# Patient Record
Sex: MALE | Race: WHITE | NOT HISPANIC OR LATINO | URBAN - METROPOLITAN AREA
[De-identification: names, ages, dates, MRNs, and addresses within clinical notes are randomized per-mention and may not be internally consistent; named-entity substitution may affect disease eponyms.]

---

## 2021-02-09 VITALS
DIASTOLIC BLOOD PRESSURE: 82 MMHG | OXYGEN SATURATION: 98 % | DIASTOLIC BLOOD PRESSURE: 72 MMHG | RESPIRATION RATE: 17 BRPM | SYSTOLIC BLOOD PRESSURE: 120 MMHG | SYSTOLIC BLOOD PRESSURE: 78 MMHG | SYSTOLIC BLOOD PRESSURE: 125 MMHG | DIASTOLIC BLOOD PRESSURE: 57 MMHG | TEMPERATURE: 98.3 F | OXYGEN SATURATION: 100 % | HEART RATE: 66 BPM | RESPIRATION RATE: 18 BRPM | DIASTOLIC BLOOD PRESSURE: 84 MMHG | OXYGEN SATURATION: 97 % | RESPIRATION RATE: 14 BRPM | HEART RATE: 67 BPM | SYSTOLIC BLOOD PRESSURE: 100 MMHG | RESPIRATION RATE: 16 BRPM | RESPIRATION RATE: 9 BRPM | HEART RATE: 65 BPM | SYSTOLIC BLOOD PRESSURE: 90 MMHG | DIASTOLIC BLOOD PRESSURE: 59 MMHG | DIASTOLIC BLOOD PRESSURE: 89 MMHG | TEMPERATURE: 96 F | SYSTOLIC BLOOD PRESSURE: 111 MMHG | DIASTOLIC BLOOD PRESSURE: 52 MMHG | SYSTOLIC BLOOD PRESSURE: 88 MMHG | DIASTOLIC BLOOD PRESSURE: 56 MMHG | HEART RATE: 68 BPM | HEIGHT: 69 IN | WEIGHT: 182 LBS | RESPIRATION RATE: 15 BRPM | SYSTOLIC BLOOD PRESSURE: 86 MMHG | HEART RATE: 63 BPM

## 2021-02-11 PROBLEM — Z12.11 SCREENING FOR COLONIC NEOPLASIA: Status: ACTIVE | Noted: 2021-02-12

## 2021-02-12 ENCOUNTER — AMBULATORY SURGICAL CENTER (OUTPATIENT)
Dept: URBAN - METROPOLITAN AREA SURGERY 17 | Facility: SURGERY | Age: 51
End: 2021-02-12
Payer: COMMERCIAL

## 2021-02-12 DIAGNOSIS — Z12.11 ENCOUNTER FOR SCREENING FOR MALIGNANT NEOPLASM OF COLON: ICD-10-CM

## 2021-02-12 PROCEDURE — 45378 DIAGNOSTIC COLONOSCOPY: CPT | Mod: 33 | Performed by: INTERNAL MEDICINE

## 2021-09-07 ENCOUNTER — HOSPITAL ENCOUNTER (EMERGENCY)
Facility: HOSPITAL | Age: 51
Discharge: HOME | End: 2021-09-07
Attending: EMERGENCY MEDICINE | Admitting: EMERGENCY MEDICINE

## 2021-09-07 ENCOUNTER — APPOINTMENT (OUTPATIENT)
Dept: GENERAL RADIOLOGY | Facility: HOSPITAL | Age: 51
End: 2021-09-07

## 2021-09-07 VITALS
OXYGEN SATURATION: 96 % | TEMPERATURE: 97.7 F | SYSTOLIC BLOOD PRESSURE: 147 MMHG | RESPIRATION RATE: 20 BRPM | HEART RATE: 58 BPM | DIASTOLIC BLOOD PRESSURE: 109 MMHG

## 2021-09-07 DIAGNOSIS — R07.89 ATYPICAL CHEST PAIN: Primary | ICD-10-CM

## 2021-09-07 LAB
ALBUMIN SERPL-MCNC: 4.2 G/DL (ref 3.5–5.2)
ALBUMIN/GLOB SERPL: 1.6 G/DL
ALP SERPL-CCNC: 107 U/L (ref 39–117)
ALT SERPL W P-5'-P-CCNC: 52 U/L (ref 1–41)
ANION GAP SERPL CALCULATED.3IONS-SCNC: 13.9 MMOL/L (ref 5–15)
AST SERPL-CCNC: 61 U/L (ref 1–40)
BASOPHILS # BLD AUTO: 0.06 10*3/MM3 (ref 0–0.2)
BASOPHILS NFR BLD AUTO: 0.8 % (ref 0–1.5)
BILIRUB SERPL-MCNC: 0.8 MG/DL (ref 0–1.2)
BUN SERPL-MCNC: 6 MG/DL (ref 6–20)
BUN/CREAT SERPL: 5.9 (ref 7–25)
CALCIUM SPEC-SCNC: 8.8 MG/DL (ref 8.6–10.5)
CHLORIDE SERPL-SCNC: 102 MMOL/L (ref 98–107)
CO2 SERPL-SCNC: 23.1 MMOL/L (ref 22–29)
CREAT SERPL-MCNC: 1.02 MG/DL (ref 0.76–1.27)
DEPRECATED RDW RBC AUTO: 50.1 FL (ref 37–54)
EOSINOPHIL # BLD AUTO: 0.05 10*3/MM3 (ref 0–0.4)
EOSINOPHIL NFR BLD AUTO: 0.6 % (ref 0.3–6.2)
ERYTHROCYTE [DISTWIDTH] IN BLOOD BY AUTOMATED COUNT: 14.5 % (ref 12.3–15.4)
GFR SERPL CREATININE-BSD FRML MDRD: 77 ML/MIN/1.73
GLOBULIN UR ELPH-MCNC: 2.7 GM/DL
GLUCOSE SERPL-MCNC: 84 MG/DL (ref 65–99)
HCT VFR BLD AUTO: 45.7 % (ref 37.5–51)
HGB BLD-MCNC: 15.6 G/DL (ref 13–17.7)
IMM GRANULOCYTES # BLD AUTO: 0.03 10*3/MM3 (ref 0–0.05)
IMM GRANULOCYTES NFR BLD AUTO: 0.4 % (ref 0–0.5)
LYMPHOCYTES # BLD AUTO: 0.99 10*3/MM3 (ref 0.7–3.1)
LYMPHOCYTES NFR BLD AUTO: 12.8 % (ref 19.6–45.3)
MCH RBC QN AUTO: 31.8 PG (ref 26.6–33)
MCHC RBC AUTO-ENTMCNC: 34.1 G/DL (ref 31.5–35.7)
MCV RBC AUTO: 93.1 FL (ref 79–97)
MONOCYTES # BLD AUTO: 0.8 10*3/MM3 (ref 0.1–0.9)
MONOCYTES NFR BLD AUTO: 10.3 % (ref 5–12)
NEUTROPHILS NFR BLD AUTO: 5.81 10*3/MM3 (ref 1.7–7)
NEUTROPHILS NFR BLD AUTO: 75.1 % (ref 42.7–76)
NRBC BLD AUTO-RTO: 0 /100 WBC (ref 0–0.2)
PLATELET # BLD AUTO: 196 10*3/MM3 (ref 140–450)
PMV BLD AUTO: 9.1 FL (ref 6–12)
POTASSIUM SERPL-SCNC: 3.4 MMOL/L (ref 3.5–5.2)
PROT SERPL-MCNC: 6.9 G/DL (ref 6–8.5)
QT INTERVAL: 675 MS
RBC # BLD AUTO: 4.91 10*6/MM3 (ref 4.14–5.8)
SODIUM SERPL-SCNC: 139 MMOL/L (ref 136–145)
TROPONIN T SERPL-MCNC: <0.01 NG/ML (ref 0–0.03)
WBC # BLD AUTO: 7.74 10*3/MM3 (ref 3.4–10.8)

## 2021-09-07 PROCEDURE — 99283 EMERGENCY DEPT VISIT LOW MDM: CPT

## 2021-09-07 PROCEDURE — 93010 ELECTROCARDIOGRAM REPORT: CPT | Performed by: INTERNAL MEDICINE

## 2021-09-07 PROCEDURE — 80053 COMPREHEN METABOLIC PANEL: CPT | Performed by: EMERGENCY MEDICINE

## 2021-09-07 PROCEDURE — 71045 X-RAY EXAM CHEST 1 VIEW: CPT

## 2021-09-07 PROCEDURE — 84484 ASSAY OF TROPONIN QUANT: CPT | Performed by: EMERGENCY MEDICINE

## 2021-09-07 PROCEDURE — 93005 ELECTROCARDIOGRAM TRACING: CPT

## 2021-09-07 PROCEDURE — 85025 COMPLETE CBC W/AUTO DIFF WBC: CPT | Performed by: EMERGENCY MEDICINE

## 2021-09-07 NOTE — ED PROVIDER NOTES
EMERGENCY DEPARTMENT ENCOUNTER    Room Number:  16/16  Date seen:  9/7/2021  PCP: THOMAS Duron MD  Historian: Patient      HPI:  Chief Complaint: Chest tightness  A complete HPI/ROS/PMH/PSH/SH/FH are unobtainable due to: Nothing  Context: Ha Ho is a 50 y.o. male who presents to the ED c/o episode of chest tightness that occurred while sitting at his desk at work today.  He reports that his chest felt tight and he also had associated nausea and felt short of breath.  The symptoms last about 45 minutes.  He denies any symptoms currently.  He reports a history of anxiety and he believes he may have just had a panic attack.  He is not a smoker.  He does drink alcohol socially.  He denies history of coronary disease.  He does have a history of prolonged QT interval.  He previously saw a cardiologist but reports it has been quite a while.  He also recalls having a stress test done at some point the past but cannot recall when.  He denies any history of blood clots.  No leg pain or leg swelling.  No recent long car rides or immobilization.            PAST MEDICAL HISTORY  Active Ambulatory Problems     Diagnosis Date Noted   • No Active Ambulatory Problems     Resolved Ambulatory Problems     Diagnosis Date Noted   • No Resolved Ambulatory Problems     No Additional Past Medical History         PAST SURGICAL HISTORY  No past surgical history on file.      FAMILY HISTORY  No family history on file.      SOCIAL HISTORY  Social History     Socioeconomic History   • Marital status:      Spouse name: Not on file   • Number of children: Not on file   • Years of education: Not on file   • Highest education level: Not on file         ALLERGIES  Patient has no known allergies.        REVIEW OF SYSTEMS  Review of Systems   Review of all 14 systems is negative other than stated in the HPI above.      PHYSICAL EXAM  ED Triage Vitals [09/07/21 1442]   Temp Heart Rate Resp BP SpO2   97.7 °F (36.5 °C) 55 20 134/98 98 %       Temp src Heart Rate Source Patient Position BP Location FiO2 (%)   -- -- -- -- --         GENERAL: Awake and alert, no acute distress  HENT: nares patent  EYES: no scleral icterus, EOMI  CV: regular rhythm, normal rate, no murmur   RESPIRATORY: normal effort, lungs clear to auscultation bilaterally  ABDOMEN: soft, nondistended, nontender throughout  MUSCULOSKELETAL: no deformity, no lower extremity edema, no calf tenderness bilaterally   NEURO: alert, moves all extremities, follows commands  PSYCH:  calm, cooperative  SKIN: warm, dry    Vital signs and nursing notes reviewed.          LAB RESULTS  Recent Results (from the past 24 hour(s))   Troponin    Collection Time: 09/07/21  3:32 PM    Specimen: Blood   Result Value Ref Range    Troponin T <0.010 0.000 - 0.030 ng/mL   Comprehensive Metabolic Panel    Collection Time: 09/07/21  3:32 PM    Specimen: Blood   Result Value Ref Range    Glucose 84 65 - 99 mg/dL    BUN 6 6 - 20 mg/dL    Creatinine 1.02 0.76 - 1.27 mg/dL    Sodium 139 136 - 145 mmol/L    Potassium 3.4 (L) 3.5 - 5.2 mmol/L    Chloride 102 98 - 107 mmol/L    CO2 23.1 22.0 - 29.0 mmol/L    Calcium 8.8 8.6 - 10.5 mg/dL    Total Protein 6.9 6.0 - 8.5 g/dL    Albumin 4.20 3.50 - 5.20 g/dL    ALT (SGPT) 52 (H) 1 - 41 U/L    AST (SGOT) 61 (H) 1 - 40 U/L    Alkaline Phosphatase 107 39 - 117 U/L    Total Bilirubin 0.8 0.0 - 1.2 mg/dL    eGFR Non African Amer 77 >60 mL/min/1.73    Globulin 2.7 gm/dL    A/G Ratio 1.6 g/dL    BUN/Creatinine Ratio 5.9 (L) 7.0 - 25.0    Anion Gap 13.9 5.0 - 15.0 mmol/L   CBC Auto Differential    Collection Time: 09/07/21  3:32 PM    Specimen: Blood   Result Value Ref Range    WBC 7.74 3.40 - 10.80 10*3/mm3    RBC 4.91 4.14 - 5.80 10*6/mm3    Hemoglobin 15.6 13.0 - 17.7 g/dL    Hematocrit 45.7 37.5 - 51.0 %    MCV 93.1 79.0 - 97.0 fL    MCH 31.8 26.6 - 33.0 pg    MCHC 34.1 31.5 - 35.7 g/dL    RDW 14.5 12.3 - 15.4 %    RDW-SD 50.1 37.0 - 54.0 fl    MPV 9.1 6.0 - 12.0 fL     Platelets 196 140 - 450 10*3/mm3    Neutrophil % 75.1 42.7 - 76.0 %    Lymphocyte % 12.8 (L) 19.6 - 45.3 %    Monocyte % 10.3 5.0 - 12.0 %    Eosinophil % 0.6 0.3 - 6.2 %    Basophil % 0.8 0.0 - 1.5 %    Immature Grans % 0.4 0.0 - 0.5 %    Neutrophils, Absolute 5.81 1.70 - 7.00 10*3/mm3    Lymphocytes, Absolute 0.99 0.70 - 3.10 10*3/mm3    Monocytes, Absolute 0.80 0.10 - 0.90 10*3/mm3    Eosinophils, Absolute 0.05 0.00 - 0.40 10*3/mm3    Basophils, Absolute 0.06 0.00 - 0.20 10*3/mm3    Immature Grans, Absolute 0.03 0.00 - 0.05 10*3/mm3    nRBC 0.0 0.0 - 0.2 /100 WBC   ECG 12 Lead    Collection Time: 09/07/21  4:37 PM   Result Value Ref Range    QT Interval 675 ms       Ordered the above labs and reviewed the results.        RADIOLOGY  XR Chest 1 View    Result Date: 9/7/2021  XR CHEST 1 VW-  HISTORY: Male who is 50 years-old,  chest pain  TECHNIQUE: Frontal view of the chest  COMPARISON: None available  FINDINGS: Heart size is normal. Aorta is tortuous. Pulmonary vasculature is unremarkable. No focal pulmonary consolidation, pleural effusion, or pneumothorax is seen, lateral costophrenic angles are slightly excluded. Old granulomatous disease is apparent. No acute osseous process.      No focal pulmonary consolidation. Tortuous aorta. Follow-up as clinically indicated  This report was finalized on 9/7/2021 4:01 PM by Dr. Allen Davis M.D.        Ordered the above noted radiological studies. Reviewed by me in PACS.            PROCEDURES  Procedures            MEDICATIONS GIVEN IN ER  Medications - No data to display                MEDICAL DECISION MAKING, PROGRESS, and CONSULTS    All labs have been independently reviewed by me.  All radiology studies have been reviewed by me and discussed with radiologist dictating the report.   EKG's independently viewed and interpreted by me.  Discussion below represents my analysis of pertinent findings related to patient's condition, differential diagnosis, treatment plan  and final disposition.      Differential diagnosis includes but is not limited to:  Pneumothorax  Acute coronary syndrome  Anxiety attack  Pulmonary embolus  Acute CHF  Pericarditis  Pneumonia      ED Course as of Sep 07 1855   Tue Sep 07, 2021   1606 Troponin T: <0.010 [JR]   1606 WBC: 7.74 [JR]   1606 Hemoglobin: 15.6 [JR]   1606 Potassium(!): 3.4 [JR]   1606 ALT (SGPT)(!): 52 [JR]   1606 AST (SGOT)(!): 61 [JR]   1607 SpO2: 98 % [JR]   1607 Device (Oxygen Therapy): room air [JR]   1607 Chest x-ray independently reviewed in PACS and demonstrates no acute pneumothorax, no infiltrate, no pleural effusion.    [JR]   1607 HEART score 1 for age.  EKG and Tn negative.  CXR without pneumothorax or mediastinal widening to suggest dissection.  Low risk for PE by Well's score, clinical gestault.      [JR]   1640 EKG          EKG time: 1637  Rhythm/Rate: Sinus bradycardia, 54  P waves and MI: Normal  QRS, axis: Normal axis  ST and T waves: Prolonged QT    Interpreted Contemporaneously by me, independently viewed  Similar compared to prior earlier today from EMS          [JR]      ED Course User Index  [JR] Cliff Mcnair MD              I wore a mask, face shield, and gloves during this patient encounter.  Patient also wearing a surgical mask.  Hand hygeine performed before and after seeing the patient.    DIAGNOSIS  Final diagnoses:   Atypical chest pain         DISPOSITION  DISCHARGE    Patient discharged in stable condition.    Reviewed implications of results, diagnosis, meds, responsibility to follow up, warning signs and symptoms of possible worsening, potential complications and reasons to return to ER.    Patient/Family voiced understanding of above instructions.    Discussed plan for discharge, as there is no emergent indication for admission. Patient referred to primary care provider for BP management due to today's BP. Pt/family is agreeable and understands need for follow up and repeat testing.  Pt is  aware that discharge does not mean that nothing is wrong but it indicates no emergency is present that requires admission and they must continue care with follow-up as given below or physician of their choice.     FOLLOW-UP  THOMAS Duron MD  [address] [address] [phone]    Schedule an appointment as soon as possible for a visit            Medication List      No changes were made to your prescriptions during this visit.                   Latest Documented Vital Signs:  As of 18:55 EDT  BP- (!) 147/109 HR- 58 Temp- 97.7 °F (36.5 °C) O2 sat- 96%        --    Please note that portions of this were completed with a voice recognition program.          Cliff Mcnair MD  09/07/21 0870

## 2021-09-07 NOTE — ED NOTES
Patient from work via EMS; call was for chest pain. Patient had acute onset of dizziness and feeling like he was going to pass out. Episode lasted approximately 45 minutes. Hx of anxiety and felt like he could have had anxiety attack.     Job, [place], LOR  09/07/21 5216

## 2023-11-02 ENCOUNTER — TELEPHONE (OUTPATIENT)
Dept: ORTHOPEDIC SURGERY | Facility: CLINIC | Age: 53
End: 2023-11-02

## 2023-11-02 NOTE — TELEPHONE ENCOUNTER
Caller: MARYELLEN    Relationship to patient: [facility]   Best call back number: [phone]    Chief complaint: FX LEFT FOOT     Type of visit: NEW PATIENT WORK COMP OCC     Requested date: WITHIN 24 HRS FRACTURE      If rescheduling, when is the original appointment: N/A     Additional notes:REFERRAL IN SCHEDULE REVIEW STATUS   WORK COMP OCC, MARYELLEN WILL SCAN XRAY REPORT , PN, WORK COMP AUTH INTO PATIENT'S CHART.  [employer] , ADJUSTOR GORDY RUIZ  [phone] [facility] [id number]  DOI 10/31/23

## 2023-11-14 ENCOUNTER — OFFICE VISIT (OUTPATIENT)
Dept: SPORTS MEDICINE | Facility: CLINIC | Age: 53
End: 2023-11-14
Payer: OTHER MISCELLANEOUS

## 2023-11-14 VITALS
BODY MASS INDEX: 26.66 KG/M2 | DIASTOLIC BLOOD PRESSURE: 96 MMHG | SYSTOLIC BLOOD PRESSURE: 130 MMHG | HEART RATE: 67 BPM | OXYGEN SATURATION: 97 % | WEIGHT: 180 LBS | HEIGHT: 69 IN | TEMPERATURE: 97.8 F

## 2023-11-14 DIAGNOSIS — S82.832A CLOSED FRACTURE OF DISTAL END OF LEFT FIBULA, UNSPECIFIED FRACTURE MORPHOLOGY, INITIAL ENCOUNTER: Primary | ICD-10-CM

## 2023-11-14 RX ORDER — DEXTROAMPHETAMINE SACCHARATE, AMPHETAMINE ASPARTATE MONOHYDRATE, DEXTROAMPHETAMINE SULFATE AND AMPHETAMINE SULFATE 7.5; 7.5; 7.5; 7.5 MG/1; MG/1; MG/1; MG/1
1 CAPSULE, EXTENDED RELEASE ORAL DAILY
COMMUNITY
Start: 2023-10-10

## 2023-11-14 RX ORDER — LORAZEPAM 2 MG/1
TABLET ORAL
COMMUNITY

## 2023-11-14 RX ORDER — ATORVASTATIN CALCIUM 20 MG/1
20 TABLET, FILM COATED ORAL
COMMUNITY

## 2023-11-14 RX ORDER — NADOLOL 80 MG/1
80 TABLET ORAL DAILY
COMMUNITY

## 2023-11-14 RX ORDER — VILAZODONE HYDROCHLORIDE 40 MG/1
40 TABLET ORAL DAILY
COMMUNITY

## 2023-11-14 NOTE — LETTER
November 29, 2023       No Recipients    Patient: Ha Ho   YOB: 1970   Date of Visit: 11/14/2023       Dear Kem Gar PA-C,    Thank you for referring Ha Ho to me for evaluation. Below is a copy of my consult note.    If you have questions, please do not hesitate to call me. I look forward to following Ha along with you.         Sincerely,        Sarahy Camacho DO        CC:   No Recipients    Ha is a 53 y.o. 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  Injury occurred 10/31/23 when he rolled his ankle while coming down a ladder at work. [place] a pop. Iced when he got home but pain was throbbing and keeping him up at night so he presented for evaluation the following day. X-rays were taken which showed a distal fibula fracture.   Pain is currently rated 8/10 and described as a stabbing, throbbing, and aching pain.   Pain is located on the lateral aspect.   Also has pain at his left great toe and first MTP joint.   Admits to associated swelling, bruising (including dependent bruising to toes), and stiffness.   Denies any numbness or tingling.   Does not feel like his toe has been red or hot to the touch.  Pain worsens with weight bearing, and movement of the ankle and toe, including flexion of the great toe.   Has been managing symptoms with Advil as needed, but that has been infrequent.  Denies any previous injury or occurrence.   History of gout previously on the right great toe.      The following data was reviewed by: Sarahy Camacho DO on 11/14/2023:  Data reviewed : Imaging      Left Ankle X-Ray from 11/1/23  Images personally reviewed and interpreted  Indication: Pain  Views: AP, Lateral,  "Mortise  Findings:  Nondisplaced fracture at the distal tip of the lateral malleolus  No widening of the mortise  No bony lesion  Lateral soft tissue swelling  Normal joint spaces  No prior studies available for comparison.      Review of Systems   All other systems reviewed and are negative.      /96 (BP Location: Right arm, Patient Position: Sitting, Cuff Size: Adult)   Pulse 67   Temp 97.8 °F (36.6 °C) (Infrared)   Ht 175.3 cm (69\")   Wt 81.6 kg (180 lb)   SpO2 97%   BMI 26.58 kg/m²        Physical Exam  Vital signs reviewed.   General: Well developed, well nourished; No acute distress.  Eyes: conjunctiva clear; pupils equally round and reactive  ENT: external ears and nose atraumatic; hearing normal  CV: no peripheral edema, 2+ distal pulses  Resp: normal respiratory effort, no use of accessory muscles  Skin: normal color and pigmentation; no rashes or wounds; normal turgor  Psych: alert and oriented; mood and affect appropriate; recent and remote memory intact  Neuro: sensation to light touch intact    MSK Exam:  The left foot and ankle is without obvious signs of acute bony deformity. There is swelling and ecchymosis of the ankle with dependent swelling and bruising in to the foot. There is no tenderness to the medial joint line. There is focal tenderness at the distal fibula and mild tenderness of the soft tissues of the lateral ankle. There is no bony crepitus or step-off. There is mild hallux valgus with tenderness at the first MTP joint but no erythema or increased warmth associated. No other midfoot or forefoot tenderness. Active range of motion at the ankle is limited and painful at the lateral aspect. Tibia-fibula squeeze and calcaneal squeeze tests are negative. There is pain at the first MTP with forefoot squeeze. Strength testing deferred given presence of fracture. The opposite ankle is otherwise normal and stable.     Left Ankle X-Ray  Indication: Pain  Views: AP, Lateral, " Mortise  Findings:  Nondisplaced fracture at the distal tip of the lateral malleolus again visualized with no change  No widening of the mortise  No bony lesion  Lateral soft tissue swelling  Small effusion  Compared to images from 11/1/23    Left Foot X-Ray  Indication: Pain  AP, Lateral, and Oblique views  Findings:  No fracture  No bony lesion  Mild hallux valgus with degenerative changes and surrounding soft tissue swelling  Small posterior calcaneal spur  No prior studies were available for comparison.      Assessment and Plan  Diagnoses and all orders for this visit:    1. 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 Initial images showed a nondisplaced distal fibula fracture. Repeat x-rays today with no significant change and no acute findings at the foot, though there are mild chronic changes at the first MTP.  We reviewed images and exam findings.  Fracture consistent with Dias A distal fibula fracture and should do well with conservative management.  He was placed in a tall cam boot which he should continue to wear when weightbearing.  I recommended continued supportive measures such as ice, elevation above the level of his heart, and over-the-counter anti-inflammatories and/or Tylenol as needed for pain and swelling. We discussed his activity level, especially related to work.  He is allowed to return to work light duty with restrictions as discussed.  We will follow-up in 2 weeks and obtain repeat imaging to monitor the fracture. All of his questions were answered and he is agreeable with the plan.    Dictated utilizing Dragon dictation.

## 2023-11-14 NOTE — LETTER
November 14, 2023     Patient: Ha Ho   YOB: 1970   Date of Visit: 11/14/2023       To Whom It May Concern:    It is my medical opinion that Ha Ho may return to light duty immediately with the following restrictions: No lifting, no climbing ladders, no walking or standing for extended periods. Should be limited to a seated or desk position until cleared by a physician.           Sincerely,        Sarahy Camacho DO    CC:   No Recipients

## 2023-11-14 NOTE — PROGRESS NOTES
Ha is a 53 y.o. 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  Injury occurred 10/31/23 when he rolled his ankle while coming down a ladder at work. [place] a pop. Iced when he got home but pain was throbbing and keeping him up at night so he presented for evaluation the following day. X-rays were taken which showed a distal fibula fracture.   Pain is currently rated 8/10 and described as a stabbing, throbbing, and aching pain.   Pain is located on the lateral aspect.   Also has pain at his left great toe and first MTP joint.   Admits to associated swelling, bruising (including dependent bruising to toes), and stiffness.   Denies any numbness or tingling.   Does not feel like his toe has been red or hot to the touch.  Pain worsens with weight bearing, and movement of the ankle and toe, including flexion of the great toe.   Has been managing symptoms with Advil as needed, but that has been infrequent.  Denies any previous injury or occurrence.   History of gout previously on the right great toe.      The following data was reviewed by: Sarahy Camacho DO on 11/14/2023:  Data reviewed : Imaging      Left Ankle X-Ray from 11/1/23  Images personally reviewed and interpreted  Indication: Pain  Views: AP, Lateral, Mortise  Findings:  Nondisplaced fracture at the distal tip of the lateral malleolus  No widening of the mortise  No bony lesion  Lateral soft tissue swelling  Normal joint spaces  No prior studies available for comparison.      Review of Systems   All other systems reviewed and are negative.      /96 (BP Location: Right arm, Patient Position: Sitting, Cuff Size: Adult)   Pulse 67   Temp 97.8 °F (36.6 °C) (Infrared)   Ht 175.3 cm  "(69\")   Wt 81.6 kg (180 lb)   SpO2 97%   BMI 26.58 kg/m²        Physical Exam  Vital signs reviewed.   General: Well developed, well nourished; No acute distress.  Eyes: conjunctiva clear; pupils equally round and reactive  ENT: external ears and nose atraumatic; hearing normal  CV: no peripheral edema, 2+ distal pulses  Resp: normal respiratory effort, no use of accessory muscles  Skin: normal color and pigmentation; no rashes or wounds; normal turgor  Psych: alert and oriented; mood and affect appropriate; recent and remote memory intact  Neuro: sensation to light touch intact    MSK Exam:  The left foot and ankle is without obvious signs of acute bony deformity. There is swelling and ecchymosis of the ankle with dependent swelling and bruising in to the foot. There is no tenderness to the medial joint line. There is focal tenderness at the distal fibula and mild tenderness of the soft tissues of the lateral ankle. There is no bony crepitus or step-off. There is mild hallux valgus with tenderness at the first MTP joint but no erythema or increased warmth associated. No other midfoot or forefoot tenderness. Active range of motion at the ankle is limited and painful at the lateral aspect. Tibia-fibula squeeze and calcaneal squeeze tests are negative. There is pain at the first MTP with forefoot squeeze. Strength testing deferred given presence of fracture. The opposite ankle is otherwise normal and stable.     Left Ankle X-Ray  Indication: Pain  Views: AP, Lateral, Mortise  Findings:  Nondisplaced fracture at the distal tip of the lateral malleolus again visualized with no change  No widening of the mortise  No bony lesion  Lateral soft tissue swelling  Small effusion  Compared to images from 11/1/23    Left Foot X-Ray  Indication: Pain  AP, Lateral, and Oblique views  Findings:  No fracture  No bony lesion  Mild hallux valgus with degenerative changes and surrounding soft tissue swelling  Small posterior " calcaneal spur  No prior studies were available for comparison.      Assessment and Plan  Diagnoses and all orders for this visit:    1. 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 Initial images showed a nondisplaced distal fibula fracture. Repeat x-rays today with no significant change and no acute findings at the foot, though there are mild chronic changes at the first MTP.  We reviewed images and exam findings.  Fracture consistent with Dias A distal fibula fracture and should do well with conservative management.  He was placed in a tall cam boot which he should continue to wear when weightbearing.  I recommended continued supportive measures such as ice, elevation above the level of his heart, and over-the-counter anti-inflammatories and/or Tylenol as needed for pain and swelling. We discussed his activity level, especially related to work.  He is allowed to return to work light duty with restrictions as discussed.  We will follow-up in 2 weeks and obtain repeat imaging to monitor the fracture. All of his questions were answered and he is agreeable with the plan.    Dictated utilizing Dragon dictation.

## 2023-11-20 ENCOUNTER — TELEPHONE (OUTPATIENT)
Dept: SPORTS MEDICINE | Facility: CLINIC | Age: 53
End: 2023-11-20

## 2023-11-20 NOTE — TELEPHONE ENCOUNTER
Caller: GORDY CAMPOS/ [employer]    Relationship: W/C     Best call back number: [phone]    What form or medical record are you requesting: OFFICE NOTE AND WORK STATUS 11/14/23    Who is requesting this form or medical record from you: W/C    How would you like to receive the form or medical records (pick-up, mail, fax): FAX  If fax, what is the fax number: [phone]    Timeframe paperwork needed: ASAP

## 2023-11-21 ENCOUNTER — PATIENT ROUNDING (BHMG ONLY) (OUTPATIENT)
Dept: SPORTS MEDICINE | Facility: CLINIC | Age: 53
End: 2023-11-21
Payer: COMMERCIAL

## 2023-11-21 NOTE — PROGRESS NOTES
November 21, 2023      A Welcome Card has been sent to the patient for [place] [facility]

## 2023-11-29 ENCOUNTER — OFFICE VISIT (OUTPATIENT)
Dept: SPORTS MEDICINE | Facility: CLINIC | Age: 53
End: 2023-11-29
Payer: OTHER MISCELLANEOUS

## 2023-11-29 VITALS — WEIGHT: 180 LBS | TEMPERATURE: 98.1 F | BODY MASS INDEX: 26.66 KG/M2 | HEIGHT: 69 IN

## 2023-11-29 DIAGNOSIS — M10.9 ACUTE GOUT INVOLVING TOE OF LEFT FOOT, UNSPECIFIED CAUSE: ICD-10-CM

## 2023-11-29 DIAGNOSIS — S82.832D CLOSED FRACTURE OF DISTAL END OF LEFT FIBULA WITH ROUTINE HEALING, UNSPECIFIED FRACTURE MORPHOLOGY, SUBSEQUENT ENCOUNTER: Primary | ICD-10-CM

## 2023-11-29 RX ORDER — PREDNISONE 10 MG/1
TABLET ORAL
Qty: 35 TABLET | Refills: 0 | Status: SHIPPED | OUTPATIENT
Start: 2023-11-29 | End: 2023-12-14

## 2023-11-29 NOTE — LETTER
November 29, 2023     Patient: Ha Ho   YOB: 1970   Date of Visit: 11/29/2023       To Whom It May Concern:    It is my medical opinion that Ha Ho may return to light duty immediately with the following restrictions: No lifting, no climbing ladders, no walking or standing for extended periods. Should remain in boot and be limited to a seated or desk position until cleared by a physician. We will follow-up in roughly 2 weeks and updated recommendations will be made at that time.           Sincerely,        Sarahy Camacho DO    CC:   No Recipients

## 2023-11-29 NOTE — LETTER
"November 29, 2023     THOMAS Duron MD  [address] [id number]    Patient: Ha Ho   YOB: 1970   Date of Visit: 11/29/2023       Dear THOMAS Duron MD    Ha Ho was in my office today. Below is a copy of my note.    I am currently managing his acute left distal fibula fracture. However, he presented today with complaints of worsening symptoms at the first MTP with findings consistent with an acute gout flare. He admits to a history of gout with more frequent flares recently. I prescribed a course of prednisone for the acute flare but recommended that he follow-up with you for further evaluation and to discuss possible chronic management.     If you have questions, please do not hesitate to call me. I look forward to following Ha along with you.         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 Initial images showed a nondisplaced distal fibula fracture. Fracture consistent with Dias A distal fibula fracture and conservative management was recommended. Please see previous notes for complete history and treatment course. He returns today reporting improvement in his ankle pain.  He has remained in the boot but was able to discontinue the crutches last week.  Though he does admit to walking a little more on Thanksgiving day and could \"feel it\" by the end of the day.  He does admit to worsening pain at the first MTP joint that is now worse than his ankle pain.  He states that it has also been red and hot to the touch, which is new since his initial visit.  States that it is even been sensitive to light touch.  " "He has not been taking any over-the-counter medications for management of his pain.  He has been continuing to work light duty, mainly doing computer work.  He denies any new injuries.    Of note, he has a history of gout, but states it is typically at the right great toe. States that his gout flares seems to be occurring more frequently, usually every few months.      Temp 98.1 °F (36.7 °C) (Infrared)   Ht 175.3 cm (69\")   Wt 81.6 kg (180 lb)   BMI 26.58 kg/m²        Physical Exam  MSK Exam:  The left foot and ankle is without obvious signs of acute bony deformity. There is persistent but improved swelling at the lateral ankle, but bruising has resolved. There is focal tenderness at the distal fibula but this has also improved. There is no bony crepitus or step-off. There is mild hallux valgus. There is increased tenderness at the first MTP, with sensitivity even to light touch and with associated erythema or increased warmth. No other midfoot or forefoot tenderness. Active range of motion at the ankle is full and with only mild pain at the lateral ankle. Tibia-fibula squeeze and calcaneal squeeze tests are negative. The opposite ankle is otherwise normal and stable.     Left Ankle X-Ray  Indication: Pain  Views: AP, Lateral, Mortise  Findings:  Nondisplaced fracture at the distal tip of the lateral malleolus again visualized with mild callus formation best seen on lateral view  No widening of the mortise  No bony lesion  Mild lateral soft tissue swelling improved  Compared to images from 11/1/23 and 11/14/23      Assessment and Plan  Diagnoses and all orders for this visit:    1. Closed fracture of distal end of left fibula with routine healing, unspecified fracture morphology, subsequent encounter (Primary)    2. Acute gout involving toe of left foot, unspecified cause  -     predniSONE (DELTASONE) 10 MG tablet; Take 4 tablets by mouth Daily for 5 days, THEN 2 tablets Daily for 5 days, THEN 1 tablet Daily for " 5 days.  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 Initial images showed a nondisplaced distal fibula fracture. Fracture consistent with Dias A distal fibula fracture and conservative management was recommended.  He returns today reporting improvement of his lateral ankle pain, but with worsening symptoms at the first MTP. Repeat x-rays today with signs of early healing at the distal fibula and improved exam findings. However, he has worsening tenderness at the first MTP with associated erythema and warmth that is consistent with an acute gout flare. A prescription was provided for a prednisone taper to be taken as directed. May take Tylenol in addition as needed for pain. I recommended continued supportive measures for the ankle: he should remain in the cam boot with WBAT and may continue with ice and elevation above the level of his heart as needed for pain and swelling. He should continue to work light duty with restrictions as discussed.  I recommended he follow-up with his PCP to discuss additional evaluation and potential long-term management of his gout, especially as he has been having more frequent flares.  We will follow-up in 2 weeks or sooner as needed. All of his questions were answered and he is agreeable with the plan.    Dictated utilizing Dragon dictation.

## 2023-11-29 NOTE — PROGRESS NOTES
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 Initial images showed a nondisplaced distal fibula fracture. Fracture consistent with Dias A distal fibula fracture and conservative management was recommended. Please see previous notes for complete history and treatment course. He returns today reporting improvement in his ankle pain.  He has remained in the boot but was able to discontinue the crutches last week.  Though he does admit to walking a little more on Thanksgiving day and could \"feel it\" by the end of the day.  He does admit to worsening pain at the first MTP joint that is now worse than his ankle pain.  He states that it has also been red and hot to the touch, which is new since his initial visit.  States that it is even been sensitive to light touch.  He has not been taking any over-the-counter medications for management of his pain.  He has been continuing to work light duty, mainly doing computer work.  He denies any new injuries.    Of note, he has a history of gout, but states it is typically at the right great toe. States that his gout flares seems to be occurring more frequently, usually every few months.      Temp 98.1 °F (36.7 °C) (Infrared)   Ht 175.3 cm (69\")   Wt 81.6 kg (180 lb)   BMI 26.58 kg/m²        Physical Exam  MSK Exam:  The left foot and ankle is without obvious signs of acute bony deformity. There is persistent but improved swelling at the lateral ankle, but bruising has resolved. There is focal tenderness at the distal fibula but this has also improved. There is no bony crepitus or step-off. There is mild hallux valgus. There is increased tenderness at the first MTP, with sensitivity even to light " touch and with associated erythema or increased warmth. No other midfoot or forefoot tenderness. Active range of motion at the ankle is full and with only mild pain at the lateral ankle. Tibia-fibula squeeze and calcaneal squeeze tests are negative. The opposite ankle is otherwise normal and stable.     Left Ankle X-Ray  Indication: Pain  Views: AP, Lateral, Mortise  Findings:  Nondisplaced fracture at the distal tip of the lateral malleolus again visualized with mild callus formation best seen on lateral view  No widening of the mortise  No bony lesion  Mild lateral soft tissue swelling improved  Compared to images from 11/1/23 and 11/14/23      Assessment and Plan  Diagnoses and all orders for this visit:    1. Closed fracture of distal end of left fibula with routine healing, unspecified fracture morphology, subsequent encounter (Primary)    2. Acute gout involving toe of left foot, unspecified cause  -     predniSONE (DELTASONE) 10 MG tablet; Take 4 tablets by mouth Daily for 5 days, THEN 2 tablets Daily for 5 days, THEN 1 tablet Daily for 5 days.  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 Initial images showed a nondisplaced distal fibula fracture. Fracture consistent with Dias A distal fibula fracture and conservative management was recommended.  He returns today reporting improvement of his lateral ankle pain, but with worsening symptoms at the first MTP. Repeat x-rays today with signs of early healing at the distal fibula and improved exam findings. However, he has worsening tenderness at the first MTP with associated erythema and warmth that is consistent with an acute gout flare. A prescription was provided for a prednisone taper to be taken as directed. May take Tylenol in addition as needed for pain. I  recommended continued supportive measures for the ankle: he should remain in the cam boot with WBAT and may continue with ice and elevation above the level of his heart as needed for pain and swelling. He should continue to work light duty with restrictions as discussed.  I recommended he follow-up with his PCP to discuss additional evaluation and potential long-term management of his gout, especially as he has been having more frequent flares.  We will follow-up in 2 weeks or sooner as needed. All of his questions were answered and he is agreeable with the plan.    Dictated utilizing Dragon dictation.

## 2023-12-01 ENCOUNTER — DOCUMENTATION (OUTPATIENT)
Dept: SPORTS MEDICINE | Facility: CLINIC | Age: 53
End: 2023-12-01
Payer: COMMERCIAL

## 2023-12-01 NOTE — PROGRESS NOTES
Printed out office notes to fax to [facility] but before I could fax them I was informed that his notes from Dr Camacho had already been faxed.  Put office notes in vincent [facility]ana.

## 2023-12-13 ENCOUNTER — OFFICE VISIT (OUTPATIENT)
Dept: SPORTS MEDICINE | Facility: CLINIC | Age: 53
End: 2023-12-13
Payer: OTHER MISCELLANEOUS

## 2023-12-13 VITALS — WEIGHT: 180 LBS | BODY MASS INDEX: 26.66 KG/M2 | HEIGHT: 69 IN

## 2023-12-13 DIAGNOSIS — S82.832D CLOSED FRACTURE OF DISTAL END OF LEFT FIBULA WITH ROUTINE HEALING, UNSPECIFIED FRACTURE MORPHOLOGY, SUBSEQUENT ENCOUNTER: Primary | ICD-10-CM

## 2023-12-13 PROCEDURE — 99213 OFFICE O/P EST LOW 20 MIN: CPT | Performed by: STUDENT IN AN ORGANIZED HEALTH CARE EDUCATION/TRAINING PROGRAM

## 2023-12-13 NOTE — LETTER
December 15, 2023     Patient: Ha Ho   YOB: 1970   Date of Visit: 12/13/2023       To Whom It May Concern:    Ha Ho is currently under my care for a significant injury that limits his physical activity level. Due to unforseen circumstances as a result of this injury, he is unable to attend his previously planned trip. Please allow him to make changes and/or cancellations to his trip as needed.            Sincerely,        Sarahy Camacho,     CC:   No Recipients

## 2023-12-13 NOTE — PROGRESS NOTES
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 Initial images showed a nondisplaced distal fibula fracture. Fracture consistent with Dias A distal fibula fracture and conservative management was recommended.  At his last visit, he returned reporting improvement of his lateral ankle pain, but with worsening symptoms at the first MTP with exam findings consistent with an acute gout flare. Ankle with improved exam findings and repeat x-rays with signs of early healing. A prescription was provided for a prednisone taper for the gout flare and I recommended continued supportive measures for the ankle. Please see previous notes for complete history and treatment course. He returns today reporting continued improvement in both his ankle pain and toe pain.  Prednisone has helped with his toe pain. Swelling and redness have also improved. He still has a couple days left of the steroid. He is still having a little pain of the toe and ankle, but he reports it is \"much better\". He has remained in the boot without significant issues. He has not needed any additional medications for management of his symptoms. He has been able to return to work while in the boot with light duty restrictions but states that they have allowed him to return to the floor for light duty tasks.  He denies any new injuries. He is scheduled to go on vacation in February and is concerned about his recovery by that time.    Of note, he has a history of gout, but states it is typically at the right great toe. States that his gout flares seems to be occurring more frequently, usually every few months.      Ht 175.3 cm (69.02\")   Wt 81.6 kg (180 lb)   BMI 26.57 kg/m²        Physical Exam  MSK Exam:  The " left foot and ankle is without obvious signs of acute bony deformity. Trace swelling at the lateral ankle. There is mild persistent focal tenderness at the distal fibula but this is again improved compared to previous. There is no bony crepitus or step-off. There is mild hallux valgus. Tenderness at the first MTP has significantly improved and nearly resolved with only trace tenderness. Associated erythema and increased warmth has resolved. No other midfoot or forefoot tenderness. Active range of motion at the ankle is full and pain-free. Strength is well 4/5 and without significant pain. Tibia-fibula squeeze and calcaneal squeeze tests are negative. The opposite ankle is otherwise normal and stable.     Left Ankle X-Ray  Indication: Pain  Views: AP, Lateral, Mortise  Findings:  Nondisplaced fracture at the distal tip of the lateral malleolus again visualized with no significant change compared to previous images  No widening of the mortise  No bony lesion  Mild lateral soft tissue swelling improved  Compared to images from 11/1/23, 11/14/23, 11/29/23      Assessment and Plan  Diagnoses and all orders for this visit:    1. 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 Initial images showed a nondisplaced distal fibula fracture. Fracture consistent with Dias A distal fibula fracture and conservative management was recommended.  At his last visit, he returned reporting improvement of his lateral ankle pain, but with worsening symptoms at the first MTP with exam findings consistent with an acute gout flare. Ankle with improved exam findings and repeat x-rays with signs of early healing. A prescription was provided for a prednisone  taper for the gout flare and I recommended continued supportive measures for the ankle. He returns today with improved symptoms at the toe and ankle. There is mild persistent tenderness at the lateral malleolus and no significant change on x-ray with fracture lucency still visualized with very limited remodeling. He is roughly 6 weeks from he date of his injury and while clinically has done well, he does have significant signs of healing on x-ray. I recommended continued conservative and supportive management as previously discussed. He should remain in the boot with any activity. He may remove the boot and work on gentle ROM exercises. He should continue to work light duty with restrictions as previously recommended and an updated note was provided.  I again stressed the importance of following-up with his PCP to discuss additional evaluation and potential long-term management of his gout, especially as he has been having more frequent flares.  We will follow-up in 3 weeks or sooner as needed. All of his questions were answered and he is agreeable with the plan.    Dictated utilizing Dragon dictation.

## 2023-12-13 NOTE — LETTER
December 13, 2023     Patient: Ha Ho   YOB: 1970   Date of Visit: 12/13/2023       To Whom It May Concern:    It is my medical opinion that Ha Ho may continue with light duty immediately with the following restrictions: No lifting, no climbing ladders or stairs, no walking or standing for extended periods. Should remain in boot and be limited to a seated or desk position until cleared by a physician. We will follow-up in roughly 2-4 weeks and updated recommendations will be made at that time.            Sincerely,        Sarahy Camacho DO    CC:   No Recipients

## 2024-01-08 ENCOUNTER — OFFICE VISIT (OUTPATIENT)
Dept: SPORTS MEDICINE | Facility: CLINIC | Age: 54
End: 2024-01-08
Payer: OTHER MISCELLANEOUS

## 2024-01-08 VITALS — BODY MASS INDEX: 26.66 KG/M2 | WEIGHT: 180 LBS | HEIGHT: 69 IN | TEMPERATURE: 97.5 F

## 2024-01-08 DIAGNOSIS — S82.832D CLOSED FRACTURE OF DISTAL END OF LEFT FIBULA WITH ROUTINE HEALING, UNSPECIFIED FRACTURE MORPHOLOGY, SUBSEQUENT ENCOUNTER: Primary | ICD-10-CM

## 2024-01-08 DIAGNOSIS — R52 PAIN: ICD-10-CM

## 2024-01-08 PROCEDURE — 99213 OFFICE O/P EST LOW 20 MIN: CPT | Performed by: STUDENT IN AN ORGANIZED HEALTH CARE EDUCATION/TRAINING PROGRAM

## 2024-01-08 RX ORDER — ALLOPURINOL 100 MG/1
TABLET ORAL
COMMUNITY
Start: 2024-01-02

## 2024-01-08 RX ORDER — PREDNISONE 10 MG/1
TABLET ORAL
COMMUNITY
Start: 2024-01-02

## 2024-01-08 NOTE — LETTER
January 8, 2024     Patient: Ha Ho   YOB: 1970   Date of Visit: 1/8/2024       To Whom It May Concern:    It is my medical opinion that Ha Ho may continue with light duty immediately with the following restrictions: No lifting greater than 25 pounds, no climbing ladders or stairs, no walking or standing for extended periods. Please allow breaks as needed. We will follow-up in roughly 4 weeks and updated recommendations will be made at that time.            Sincerely,        Sarahy Camacho DO    CC:   No Recipients

## 2024-01-08 NOTE — PROGRESS NOTES
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 Initial images showed a nondisplaced distal fibula fracture. Fracture consistent with Dias A distal fibula fracture and conservative management was recommended.  At his visit on 11/29/23, he returned with improvement in ankle symptoms and exam findings, but with signs and symptoms consistent with an acute gout flare at the first MTP. Repeat x-rays on with signs of early healing. Gout flare resolved with prednisone taper and ankle has done well with conservative management. Please see previous notes for complete history and treatment course. He returns today reporting continued improvement. He currently denies any pain. He did a lot of traveling over the holidays and had no issues. Will still feel a little \"burning\" when he walks on it a lot. Also admits that it feels very weak now. He has still been in the boot a lot because he is worried about losing balance. Still feels \"stiff\" when walking out of the boot and feels weak and unbalanced. He also thinks he had another flare of gout at the toe. He has not needed any medications for management of his symptoms. He has continued to work while in the boot with light duty restrictions.  He denies any new injuries.    Of note, he has a history of gout, but states it is typically at the right great toe. States that his gout flares seems to be occurring more frequently, usually every few months.      Temp 97.5 °F (36.4 °C) (Infrared)   Ht 175.3 cm (69.02\")   Wt 81.6 kg (180 lb)   BMI 26.57 kg/m²        Physical Exam  MSK Exam:  The left foot and ankle is without obvious signs of acute bony deformity. Swelling at the lateral ankle has resolved. There is trace " focal tenderness at the distal fibula but this is again improved compared to previous. There is no bony crepitus or step-off. There is mild hallux valgus. No other midfoot or forefoot tenderness. Active range of motion at the ankle is relatively well-preserved and appears symmetrical to right, however he does have pain with eversion. Strength at the ankle with 4/5 weakness, though no significant pain. Tibia-fibula squeeze and calcaneal squeeze tests are negative. He denies pain with WB and ambulation in the office. The opposite ankle is otherwise normal and stable.     Left Foot and Ankle X-Ray  Indication: Pain, Fracture monitoring  Views: AP, Lateral, Mortise of ankle; AP, lateral, oblique of foot  Findings:  Nondisplaced fracture at the distal tip of the lateral malleolus again visualized with signs of healing with some callus formation and bridging, though fracture lucency still visualized. No significant change compared to previous images.  No widening of the mortise  No bony lesion  So soft tissue swelling  Compared to images from 11/1/23, 11/29/23, and 12/13/23      Assessment and Plan  Diagnoses and all orders for this visit:    1. Closed fracture of distal end of left fibula with routine healing, unspecified fracture morphology, subsequent encounter (Primary)  -     XR Ankle 3+ View Left  -     Ambulatory Referral to Physical Therapy Evaluate and treat; Stretching, ROM, Strengthening    2. 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 Initial images showed a nondisplaced distal fibula fracture. Fracture consistent with Dias A distal fibula fracture and conservative management was recommended.  At his visit on 11/29/23,  he returned with improvement in ankle symptoms and exam findings, but with signs and symptoms consistent with an acute gout flare at the first MTP. Repeat x-rays on with signs of early healing. Gout flare resolved with prednisone taper and ankle has done well with conservative management. He returns today with continued improvement. Repeat x-rays today show that fracture lucency is still visualized but there are signs of remodeling with small about of callus formation and bridging. I recommended continued conservative and supportive management. He is now 10 weeks out from initial injury and prolonged immobilization is likely going to worsen his weakness and mobility. I recommended that he begin to wean out of the boot as tolerated. He was provided an ankle brace to provide with him additional support. An order was provided for physical therapy to work on range of motion and strengthening. He should continue to work light duty with restrictions as previously recommended and an updated note was provided.  He should continue to follow with his PCP for management of his gout. We will follow-up in 4 weeks or sooner as needed. All of his questions were answered and he is agreeable with the plan.    Dictated utilizing Dragon dictation.

## 2024-01-09 ENCOUNTER — TREATMENT (OUTPATIENT)
Dept: PHYSICAL THERAPY | Facility: CLINIC | Age: 54
End: 2024-01-09
Payer: OTHER MISCELLANEOUS

## 2024-01-09 DIAGNOSIS — S82.832D CLOSED FRACTURE OF DISTAL END OF LEFT FIBULA WITH ROUTINE HEALING, UNSPECIFIED FRACTURE MORPHOLOGY, SUBSEQUENT ENCOUNTER: Primary | ICD-10-CM

## 2024-01-09 PROCEDURE — 97161 PT EVAL LOW COMPLEX 20 MIN: CPT | Performed by: PHYSICAL THERAPIST

## 2024-01-09 PROCEDURE — 97530 THERAPEUTIC ACTIVITIES: CPT | Performed by: PHYSICAL THERAPIST

## 2024-01-09 PROCEDURE — 97110 THERAPEUTIC EXERCISES: CPT | Performed by: PHYSICAL THERAPIST

## 2024-01-09 NOTE — PROGRESS NOTES
Physical Therapy Initial Evaluation and Plan of Care  [address] [facility], [address]  [place] [address]    Patient: Ha Ho   : 1970  Diagnosis/ICD-10 Code:  Closed fracture of distal end of left fibula with routine healing, unspecified fracture morphology, subsequent encounter [S82.832D]  Referring practitioner: Sarahy Camacho DO  Date of Initial Visit: 2024  Today's Date: 2024  Patient seen for 1 session         Visit Diagnoses:    ICD-10-CM ICD-9-CM   1. Closed fracture of distal end of left fibula with routine healing, unspecified fracture morphology, subsequent encounter  S82.832D V54.16         Subjective Questionnaire: LEFS: 49      Subjective Evaluation    History of Present Illness  Date of onset: 10/31/2023  Mechanism of injury: Patient was getting off stairs and as he stepped down the rolled the ankle inward.  Patient was seen in Urgent Care where x-rays were taken.  X-rays showed a fracture.  Patient was seen by Dr Camacho and was put in a Cam boot.  Patient was taken out of the boot yesterday.  As of yesterday, the fracture was not healed, but was healing.    Patient denies any previous injuries to the ankle.      Patient Occupation: [employer]   Precautions and Work Restrictions: light dutyPain  Current pain ratin  At worst pain ratin  Location: left lateral ankle  Quality: burning  Relieving factors: rest  Exacerbated by: using the ankle.  Progression: improved    Diagnostic Tests  Abnormal x-ray: fracture is helaing.             Objective          Observations     Additional Ankle/Foot Observation Details  Patient ambulates with an antalgic gait pattern with the use of a lace up ankle brace.    Palpation     Additional Palpation Details  TTP to the left lateral ankle.    Active Range of Motion     Additional Active Range of Motion Details  Left Ankle AROM: DF -4, Inv WNL, Eversion WNL with pain, PF 62    Strength/Myotome Testing     Left Ankle/Foot    Dorsiflexion: 4+  Plantar flexion: 5  Inversion: 4  Eversion: 4+    Tests     Additional Tests Details  N/T at this time.          Assessment & Plan       Assessment  Impairments: abnormal gait, abnormal or restricted ROM, activity intolerance, impaired balance, impaired physical strength, lacks appropriate home exercise program and pain with function   Assessment details: Patient presents with c/o pain, TTP, limited AROM, decreased strength and an antalgic gait pattern which is limiting his ability to perform full job duties and ADL'S.  Barriers to therapy: none  Prognosis: good  Prognosis details: STG's to be met by 2 weeks  1)  Independent with HEP  2)  Decrease pain by 50% or more  3)  AROM WNL for the left ankle without pain to allow for return to activities  4)  Min to no TTP present    LTG's to be met by 4 weeks  1)  Independent with HEP progression  2)  Decrease pain by 75% or more  3)  Increase strength for the left ankle to 4+/5  4)  No TTP present to indicate improved healing  5)  Patient to ambulate with a normal gait pattern  6)  Patient to return to ADL'S without limitations       Plan  Therapy options: will be seen for skilled therapy services  Planned therapy interventions: strengthening, stretching, therapeutic activities, home exercise program, gait training and neuromuscular re-education  Frequency: 2x week  Duration in weeks: 4  Treatment plan discussed with: patient            Timed:         Manual Therapy:    0     mins  96236;     Therapeutic Exercise:    14     mins  45821;     Neuromuscular Colby:    0    mins  88097;    Therapeutic Activity:     8     mins  02815;     Gait Trainin     mins  65842;     Ultrasound:     0     mins  44338;          Un-Timed:  Electrical Stimulation:    0     mins  15452 ( );    Low Eval     12     Mins  54272  Mod Eval     0     Mins  47203  High Eval                       0     Mins  87938        Timed Treatment:   22   mins   Total Treatment:      34   mins          PT: Johnny Candelaria, PT     [place] License [id number]  Electronically signed by Johnny Candelaria, PT, 01/09/24, 9:37 AM EST    Certification Period: 1/9/2024 thru 4/7/2024  I certify that the therapy services are furnished while this patient is under my care.  The services outlined above are required by this patient, and will be reviewed every 90 days.    Sarahy Camacho Do  [address] [place] [address]   NPI: [id number]      Johnny Candelaria, PT   License number: [id number]        Physician Signature:__________________________________________________    PHYSICIAN: Sarahy Camacho DO      DATE:     Please sign and return via fax to [email or web address] . Thank you, [facility].

## 2024-01-11 ENCOUNTER — TREATMENT (OUTPATIENT)
Dept: PHYSICAL THERAPY | Facility: CLINIC | Age: 54
End: 2024-01-11
Payer: OTHER MISCELLANEOUS

## 2024-01-11 DIAGNOSIS — S82.832D CLOSED FRACTURE OF DISTAL END OF LEFT FIBULA WITH ROUTINE HEALING, UNSPECIFIED FRACTURE MORPHOLOGY, SUBSEQUENT ENCOUNTER: Primary | ICD-10-CM

## 2024-01-11 PROCEDURE — 97530 THERAPEUTIC ACTIVITIES: CPT | Performed by: PHYSICAL THERAPIST

## 2024-01-11 PROCEDURE — 97110 THERAPEUTIC EXERCISES: CPT | Performed by: PHYSICAL THERAPIST

## 2024-01-11 NOTE — PROGRESS NOTES
Physical Therapy Daily Treatment Note               [address] [facility] [address]                                                                                                                                             [place] [address]    Patient: Ha Ho   : 1970  Referring practitioner: Sarahy Camacho DO  Date of Initial Visit: Type: THERAPY  Noted: 2024  Today's Date: 2024  Patient seen for 2 sessions       Visit Diagnoses:    ICD-10-CM ICD-9-CM   1. Closed fracture of distal end of left fibula with routine healing, unspecified fracture morphology, subsequent encounter  S82.832D V54.16       Subjective Evaluation    History of Present Illness    Subjective comment: Pt denies any current pain in (L) ankle.       Objective   See Exercise, Manual, and Modality Logs for complete treatment.   Added Standing gastroc stretch, staggered weight shifts    Assessment & Plan       Assessment  Assessment details: Pt completed treatment with no c/o pain in (L) ankle.  Pt was able to progress prior exercises.  He tolerated addition of closed chain weight bearing exercises for improved functional strength with no issue. Continue to progress per POC.          Timed:         Manual Therapy:    0     mins  54909;     Therapeutic Exercise:    23     mins  70851;     Neuromuscular Colby:    0   mins  16443;    Therapeutic Activity:     8     mins  77159;     Gait Trainin     mins  61512;     Ultrasound:     0     mins  93915;    E Stim                            0    mins   84582( g0283)  Work Mckeon/Cond      0    mins   98414        Timed Treatment:   31   mins   Total Treatment:     31   mins    Paul Edmondson PTA  [place] License: [id number]

## 2024-01-16 ENCOUNTER — TELEPHONE (OUTPATIENT)
Dept: ORTHOPEDICS | Facility: OTHER | Age: 54
End: 2024-01-16
Payer: OTHER MISCELLANEOUS

## 2024-01-18 ENCOUNTER — TELEPHONE (OUTPATIENT)
Dept: PHYSICAL THERAPY | Facility: CLINIC | Age: 54
End: 2024-01-18

## 2024-01-23 ENCOUNTER — TELEPHONE (OUTPATIENT)
Dept: PHYSICAL THERAPY | Facility: CLINIC | Age: 54
End: 2024-01-23

## 2024-01-25 ENCOUNTER — TREATMENT (OUTPATIENT)
Dept: PHYSICAL THERAPY | Facility: CLINIC | Age: 54
End: 2024-01-25
Payer: OTHER MISCELLANEOUS

## 2024-01-25 ENCOUNTER — TELEPHONE (OUTPATIENT)
Dept: PHYSICAL THERAPY | Facility: CLINIC | Age: 54
End: 2024-01-25

## 2024-01-25 DIAGNOSIS — S82.832D CLOSED FRACTURE OF DISTAL END OF LEFT FIBULA WITH ROUTINE HEALING, UNSPECIFIED FRACTURE MORPHOLOGY, SUBSEQUENT ENCOUNTER: Primary | ICD-10-CM

## 2024-01-25 PROCEDURE — 97110 THERAPEUTIC EXERCISES: CPT | Performed by: PHYSICAL THERAPIST

## 2024-01-25 PROCEDURE — 97530 THERAPEUTIC ACTIVITIES: CPT | Performed by: PHYSICAL THERAPIST

## 2024-01-25 NOTE — TELEPHONE ENCOUNTER
PATIENT LEFT HIS RED CAMMIE JACKET IN THE OFFICE THIS MORNING - PLEASE SAVE IT FOR HIM - HE WILL PICK IT UP NEXT TUESDAY

## 2024-01-25 NOTE — PROGRESS NOTES
Physical Therapy Daily Treatment Note  [address] [facility], [address]  [place] [address]      Patient: Ha Ho   : 1970  Referring practitioner: Sarahy Camacho DO  Date of Initial Visit: Type: THERAPY  Noted: 2024  Today's Date: 2024  Patient seen for 3 sessions       Visit Diagnoses:    ICD-10-CM ICD-9-CM   1. Closed fracture of distal end of left fibula with routine healing, unspecified fracture morphology, subsequent encounter  S82.832D V54.16        Subjective   Patient reports that the anlk ehas been pretty good, been doing a lot of walking.  Feels weak.  Currently denies pain.    Objective   See Exercise, Manual, and Modality Logs for complete treatment.       Assessment/Plan  Subjective reports are much improved.  Modified the exercise routine to include all weight bearing activity, including stretches, strengthening and proprioception.  Patient tolerated the progression of exercises very well, no visual or verbal signs of pain in the ankle.      Timed:         Manual Therapy:    0     mins  17338;     Therapeutic Exercise:    12     mins  77109;     Neuromuscular Colby:    0    mins  10524;    Therapeutic Activity:     8     mins  81645;     Gait Training      0    mins  03601;  Work Conditioning     0   mins  24959       Untimed:  Electrical Stimulation:    0     mins  38437 ( );      Timed Treatment:   20   mins   Total Treatment:     20   mins    Johnny Candelaria, PT  [place] License: [id number]

## 2024-01-30 ENCOUNTER — TELEPHONE (OUTPATIENT)
Dept: SPORTS MEDICINE | Facility: CLINIC | Age: 54
End: 2024-01-30
Payer: OTHER MISCELLANEOUS

## 2024-01-30 ENCOUNTER — TREATMENT (OUTPATIENT)
Dept: PHYSICAL THERAPY | Facility: CLINIC | Age: 54
End: 2024-01-30
Payer: OTHER MISCELLANEOUS

## 2024-01-30 DIAGNOSIS — S82.832D CLOSED FRACTURE OF DISTAL END OF LEFT FIBULA WITH ROUTINE HEALING, UNSPECIFIED FRACTURE MORPHOLOGY, SUBSEQUENT ENCOUNTER: Primary | ICD-10-CM

## 2024-01-30 PROCEDURE — 97110 THERAPEUTIC EXERCISES: CPT | Performed by: PHYSICAL THERAPIST

## 2024-01-30 PROCEDURE — 97530 THERAPEUTIC ACTIVITIES: CPT | Performed by: PHYSICAL THERAPIST

## 2024-01-30 NOTE — PROGRESS NOTES
Physical Therapy Daily Treatment Note               [address] [facility] [address]                                                                                                                                             [place] [address]    Patient: Ha Ho   : 1970  Referring practitioner: Sarahy Camacho DO  Date of Initial Visit: Type: THERAPY  Noted: 2024  Today's Date: 2024  Patient seen for 4 sessions       Visit Diagnoses:    ICD-10-CM ICD-9-CM   1. Closed fracture of distal end of left fibula with routine healing, unspecified fracture morphology, subsequent encounter  S82.832D V54.16       Subjective Evaluation    History of Present Illness    Subjective comment: Pt reports that his (L) ankle was very fatigued after his last treatment.  Only was able to do HEP twice since.       Objective   See Exercise, Manual, and Modality Logs for complete treatment.   Added squats    Assessment & Plan       Assessment  Assessment details: Pt completed treatment with no c/o pain in (L) ankle.  Pt did c/o fatigue after finishing heel raises.  When performing squats for the first time pt had no eccentric control and dropped into a very deep squat.  Pt was advised to control his descent and stop at parallel.  He finished with no further issues.  Continue to progress per POC.          Timed:         Manual Therapy:    0     mins  83535;     Therapeutic Exercise:    24     mins  14847;     Neuromuscular Colby:    0    mins  40263;    Therapeutic Activity:     8     mins  54352;     Gait Trainin     mins  48409;     Ultrasound:     0     mins  27199;    E Stim                            0    mins   94099( g0283)  Work Mckeon/Cond      0    mins   14488        Timed Treatment:   32   mins   Total Treatment:     32   mins    Paul Edmondson PTA  [place] License: [id number]

## 2024-01-30 NOTE — TELEPHONE ENCOUNTER
Pt advised his ankle brace broke and wants to know if he can get a new one. Please call pt to advise, he stated he will be doing PT at 10am today at [facility] location.

## 2024-01-30 NOTE — TELEPHONE ENCOUNTER
Attempted to call pt to advise new Brace is ready for , the mailbox was full unable to leave message.    HUB--if pt calls back please advise above note

## 2024-02-01 ENCOUNTER — TELEPHONE (OUTPATIENT)
Dept: PHYSICAL THERAPY | Facility: CLINIC | Age: 54
End: 2024-02-01

## 2024-02-01 NOTE — TELEPHONE ENCOUNTER
L/M FOR PATIENT IN REGARDS TO MISSED PT APPOINTMENT. GAVE NEXT APPOINTMENT DATE AND TIME. GAVE OFFICE CALL BACK NUMBER

## 2024-02-06 ENCOUNTER — TREATMENT (OUTPATIENT)
Dept: PHYSICAL THERAPY | Facility: CLINIC | Age: 54
End: 2024-02-06
Payer: OTHER MISCELLANEOUS

## 2024-02-06 DIAGNOSIS — S82.832D CLOSED FRACTURE OF DISTAL END OF LEFT FIBULA WITH ROUTINE HEALING, UNSPECIFIED FRACTURE MORPHOLOGY, SUBSEQUENT ENCOUNTER: Primary | ICD-10-CM

## 2024-02-06 PROCEDURE — 97530 THERAPEUTIC ACTIVITIES: CPT | Performed by: PHYSICAL THERAPIST

## 2024-02-06 PROCEDURE — 97110 THERAPEUTIC EXERCISES: CPT | Performed by: PHYSICAL THERAPIST

## 2024-02-06 NOTE — PROGRESS NOTES
Physical Therapy Daily Treatment Note               [address] [facility] [address]                                                                                                                                             [place] [address]    Patient: aH Ho   : 1970  Referring practitioner: Sarahy Camacho DO  Date of Initial Visit: Type: THERAPY  Noted: 2024  Today's Date: 2024  Patient seen for 5 sessions       Visit Diagnoses:    ICD-10-CM ICD-9-CM   1. Closed fracture of distal end of left fibula with routine healing, unspecified fracture morphology, subsequent encounter  S82.832D V54.16       Subjective Evaluation    History of Present Illness    Subjective comment: Pt denies any pain in (L) ankle/LE today.       Objective   See Exercise, Manual, and Modality Logs for complete treatment.       Assessment & Plan       Assessment  Assessment details: Pt completed treatment with no c/o pain in (L) ankle.  Pt was able to climb a wall ladder with no issues.  He is able to climb steps reciprocally today.  Pt tolerated progressions in program today with no issues.  Continue to progress per POC.          Timed:         Manual Therapy:    0     mins  63867;     Therapeutic Exercise:    16     mins  61385;     Neuromuscular Colby:    0    mins  61022;    Therapeutic Activity:     10     mins  76620;     Gait Trainin     mins  54875;     Ultrasound:     0     mins  21118;    E Stim                            0    mins   57589( g0283)  Work Mckeon/Cond      0    mins   20717        Timed Treatment:   26   mins   Total Treatment:     26   mins    Paul Edmondson PTA  [place] License: [id number]

## 2024-02-09 ENCOUNTER — OFFICE VISIT (OUTPATIENT)
Dept: SPORTS MEDICINE | Facility: CLINIC | Age: 54
End: 2024-02-09
Payer: OTHER MISCELLANEOUS

## 2024-02-09 VITALS — BODY MASS INDEX: 26.66 KG/M2 | HEIGHT: 69 IN | WEIGHT: 180 LBS | TEMPERATURE: 97.8 F

## 2024-02-09 DIAGNOSIS — S82.832D CLOSED FRACTURE OF DISTAL END OF LEFT FIBULA WITH ROUTINE HEALING, UNSPECIFIED FRACTURE MORPHOLOGY, SUBSEQUENT ENCOUNTER: Primary | ICD-10-CM

## 2024-02-09 PROCEDURE — 99213 OFFICE O/P EST LOW 20 MIN: CPT | Performed by: STUDENT IN AN ORGANIZED HEALTH CARE EDUCATION/TRAINING PROGRAM

## 2024-02-09 NOTE — PROGRESS NOTES
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 Initial images showed a nondisplaced distal fibula fracture. Fracture consistent with Dias A distal fibula fracture and conservative management was recommended.  At his visit on 11/29/23, he returned with improvement in ankle symptoms and exam findings, but with signs and symptoms consistent with an acute gout flare at the first MTP. Repeat x-rays on with signs of early healing at distal fibula. Gout flare resolved with prednisone taper and ankle has done well with conservative management. Please see previous notes for complete history and treatment course. He returns today reporting continued improvement and near complete resolution. He denies pain currently or with day to day activity. We states that he will sometimes feel a little \"burn\" but \"nothing alarming\". Also feels like his balance still isn't great and he has nearly slipped a couple times, but admits that he was also wearing socks on hard wood floor. Has been using the ASO pretty consistently. Has done with PT and has been cleared by PT to continue HEP on his own so long as he is cleared by me. He is scheduled for a ski trip in 2 weeks. He denies any new injuries or complaints.    Of note, he has a history of gout, but states it is typically at the right great toe. States that his gout flares seems to be occurring more frequently, usually every few months.      Temp 97.8 °F (36.6 °C) (Infrared)   Ht 175.3 cm (69\")   Wt 81.6 kg (180 lb)   BMI 26.58 kg/m²        Physical Exam  MSK Exam:  The left foot and ankle is without obvious signs of acute bony deformity or swelling. Tenderness at the distal fibula has almost completely " resolved, stating he can tell it feels slightly different in that area, but is not painful. There is no bony crepitus or step-off. There is no soft tissue tenderness surrounding the lateral malleolus or remainder of the ankle. There is mild hallux valgus. No other midfoot or forefoot tenderness. Active range of motion at the ankle is full, symmetrical, and pain-free. Strength at the ankle is 5/5, symmetrical, and pain-free. No pain with SL stance or SL heel raise, though there is mild weakness on the left appreciated. The opposite ankle is otherwise normal and stable.       Assessment and Plan  Diagnoses and all orders for this visit:    1. 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 Initial images showed a nondisplaced distal fibula fracture. Fracture consistent with Dias A distal fibula fracture and conservative management was recommended.  At his visit on 11/29/23, he returned with improvement in ankle symptoms and exam findings, but with signs and symptoms consistent with an acute gout flare at the first MTP. Repeat x-rays with signs of healing. Gout flare resolved with prednisone taper and the ankle has done well with conservative management. He returns today with continued improvement and near complete resolution of symptoms. On exam, he has no pain or tenderness, and full ROM and strength on strength testing, though he does still have some weakness (though no pain) with SL heel raises and balance. I stressed the importance if continuing with his HEP and progressing his activity level as tolerated. He is cleared to return to work with no restriction. We discussed his upcoming ski trip. I think that he would be able to  tolerate a few easy runs, but warned him that he still has weakness and his endurance is now where it is so should limit how much he does if he does decide to continue with the trip. May continue to wear the brace as needed for comfort or support. We will follow-up as needed.  All of his questions were answered and he is agreeable with the plan.    Dictated utilizing Dragon dictation.

## 2024-02-09 NOTE — LETTER
February 9, 2024     Patient: Ha Ho   YOB: 1970   Date of Visit: 2/9/2024       To Whom It May Concern:    It is my medical opinion that Ha Ho may return to work with no restrictions on 2/10/2024.           Sincerely,        Sarahy Camacho DO    CC:   No Recipients

## 2024-03-27 ENCOUNTER — DOCUMENTATION (OUTPATIENT)
Dept: PHYSICAL THERAPY | Facility: CLINIC | Age: 54
End: 2024-03-27
Payer: OTHER MISCELLANEOUS